# Patient Record
Sex: MALE
[De-identification: names, ages, dates, MRNs, and addresses within clinical notes are randomized per-mention and may not be internally consistent; named-entity substitution may affect disease eponyms.]

---

## 2020-07-27 ENCOUNTER — FORM ENCOUNTER (OUTPATIENT)
Age: 45
End: 2020-07-27

## 2020-07-28 PROBLEM — Z00.00 ENCOUNTER FOR PREVENTIVE HEALTH EXAMINATION: Status: ACTIVE | Noted: 2020-07-28

## 2020-07-29 ENCOUNTER — APPOINTMENT (OUTPATIENT)
Dept: ORTHOPEDIC SURGERY | Facility: CLINIC | Age: 45
End: 2020-07-29
Payer: COMMERCIAL

## 2020-07-29 VITALS
HEART RATE: 103 BPM | DIASTOLIC BLOOD PRESSURE: 97 MMHG | BODY MASS INDEX: 27.85 KG/M2 | HEIGHT: 69 IN | WEIGHT: 188 LBS | OXYGEN SATURATION: 98 % | SYSTOLIC BLOOD PRESSURE: 148 MMHG

## 2020-07-29 DIAGNOSIS — Z60.2 PROBLEMS RELATED TO LIVING ALONE: ICD-10-CM

## 2020-07-29 DIAGNOSIS — Z78.9 OTHER SPECIFIED HEALTH STATUS: ICD-10-CM

## 2020-07-29 DIAGNOSIS — G56.01 CARPAL TUNNEL SYNDROME, RIGHT UPPER LIMB: ICD-10-CM

## 2020-07-29 DIAGNOSIS — Z86.69 PERSONAL HISTORY OF OTHER DISEASES OF THE NERVOUS SYSTEM AND SENSE ORGANS: ICD-10-CM

## 2020-07-29 DIAGNOSIS — Z72.3 LACK OF PHYSICAL EXERCISE: ICD-10-CM

## 2020-07-29 DIAGNOSIS — Z56.0 UNEMPLOYMENT, UNSPECIFIED: ICD-10-CM

## 2020-07-29 DIAGNOSIS — Q76.49 OTHER CONGENITAL MALFORMATIONS OF SPINE, NOT ASSOCIATED WITH SCOLIOSIS: ICD-10-CM

## 2020-07-29 PROCEDURE — 73030 X-RAY EXAM OF SHOULDER: CPT | Mod: LT

## 2020-07-29 PROCEDURE — 73090 X-RAY EXAM OF FOREARM: CPT | Mod: LT

## 2020-07-29 PROCEDURE — 99204 OFFICE O/P NEW MOD 45 MIN: CPT

## 2020-07-29 SDOH — SOCIAL STABILITY - SOCIAL INSECURITY: PROBLEMS RELATED TO LIVING ALONE: Z60.2

## 2020-07-29 SDOH — ECONOMIC STABILITY - INCOME SECURITY: UNEMPLOYMENT, UNSPECIFIED: Z56.0

## 2020-07-29 NOTE — HISTORY OF PRESENT ILLNESS
[de-identified] : Mr. Edmonds he is a 45 year-old Left-hand-dominant gentleman who presents with shooting pains running up the LEFT arm from the wrist to the elbow as well as intermittent stabbing pains in his LEFT shoulder. The LEFT hand/forearm pain began about 3 years ago. He used to do a lot of boxing but he stopped. He had been treated by another doctor who recommended rest and pain did go away after a while but came back about 3 months ago without any particular reason. It hurts lifting and grabbing things Pain can be about 7/10 and shooting and stabbing. It's relatively constant. Forearm pain can occur with a hand hanging down.\par He has tried Advil, Tylenol and codeine without relief. He has not done physical therapy.\par He went for EMG studies about 2 weeks ago and this apparently showed carpal tunnel syndrome. I did get the report afterwards which shows moderate bilateral median mononeuropathy at the wrist consistent with carpal tunnel. There was no evidence of radiculopathy, myopathy, focal neuropathy or polyneuropathy. He was advised to wear wrist splint at nightbut found that the pain got much worse when he wore the splint which is certainly unusual.He states that the splints were not tight around his wrist.\par The shoulder pain started about 7-8 months ago and occurs when he raises his arm overhead. There was never any injury. Lying on the shoulder is painful. Pain is intermittent.\par He has had an MRI of his cervical spine.\par At the time of the visit he felt Mild numbness in the fingers of the thumb, index and middle fingers bilaterally slightly more on the breakdown the LEFT.\par He's also had issues in his legs with intermittent numbness down the legs

## 2020-07-29 NOTE — PHYSICAL EXAM
[UE] : Sensory: Intact in bilateral upper extremities [Rad] : radial 2+ and symmetric bilaterally [Normal RUE] : Right Upper Extremity: No scars, rashes, lesions, ulcers, skin intact [Normal LUE] : Left Upper Extremity: No scars, rashes, lesions, ulcers, skin intact [Normal Touch] : sensation intact for touch [Normal] : Oriented to person, place, and time, insight and judgement were intact and the affect was normal [de-identified] : No respiratory distress or coughing [de-identified] : Cervical spine and upper extremities\par Range of motion of the cervical spine causes mild pain on the LEFT side with full extension and bending to the RIGHT. No tenderness.\par  shoulder:\par No edema, ecchymoses, erythema bilateral upper extremities and skin is intact.\par Shoulder AROM: 180 FE, IR to T 9  , 180 abd.Pain LEFT shoulder at maximum elevation. No drop arm Or Shoulder shrug\par Shoulder PROM: 180 FE, 70 ER at the side, 90 ER and 50 RIGHT and 30° LEFT IR in the 90 degree abducted position.\par Motor:  5/5  supraspinatus,  5/5 ER, 5/5 IR, 5/5 biceps, 5/5 deltoid.  Normal lift off test\par LEFT + Neer and Alvarez. -  X-arm.   Mild pain on the LEFT with [place]'s, - Speeds.All negative on the RIGHT\par Nontender LEFT shoulder including a.c. joint  \par Laxity: normal. \par Elbow ROM bilaterally is 0-150° flexion and 90° pronation and supination.Tender at the LEFT lateral epicondylectomy and common extensor origin.\par Pain is reproduced on the LEFT side with resisted middle finger extension.\par No scapular winging.\par Sensation is intact distally in the UE Except some subjectively decreased sensation in the fingertips radially.\par Skin is intact in the UE. \par Intact Motor distally.Good  strength, finger abduction, biceps, triceps\par  [de-identified] : \par x-rays taken today of the LEFT shoulder 3 views show mild narrowing a.c. joint that may be consistent with mild a.c. arthrosis. Glenohumeral joint is normal. 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

## 2020-07-29 NOTE — REASON FOR VISIT
[Initial Visit] : an initial visit for [Shoulder Pain] : shoulder pain [FreeTextEntry2] : forearm pain

## 2020-07-29 NOTE — ASSESSMENT
[FreeTextEntry1] : 45-year-old left-hand-dominant gentleman with pain, Numbness upper and lower extremities. Today I was focusing on his LEFT upper extremity.. He has carpal tunnel syndrome confirmed by EMG studies. Based on exam he has lateral epicondylitis and LEFT shoulder rotator cuff tendinopathy/impingement. There is good strength of the rotator cuff but he has some loss of internal rotation and stiffness. For both these conditions I recommended physical therapy. He should do stretching and strengthening of the rotator cuff and periscapular muscles and stretch the forearm tendons. I suggested taking an anti-inflammatory starting with diclofenac orally for a few weeks and then he could use the Voltaren gel on his elbow or shoulder. 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 If his shoulder is not getting better I may refer him for an MRI or refer him for her imaging of the elbow if that is not improving. These conditions often can take several months at least to get better. Tennis elbow often take 6 months to a year.

## 2020-07-29 NOTE — CONSULT LETTER
[Dear  ___] : Dear  [unfilled], [Consult Letter:] : I had the pleasure of evaluating your patient, [unfilled]. [Consult Closing:] : Thank you very much for allowing me to participate in the care of this patient.  If you have any questions, please do not hesitate to contact me. [Please see my note below.] : Please see my note below. [Sincerely,] : Sincerely, [FreeTextEntry1] : In addition to the bilateral carpal tunnel syndrome he appears to have LEFT lateral epicondylitis/tennis elbow and LEFT shoulder rotator cuff tendinopathy/impingement syndrome. I referred him to physical therapy and recommended that he take an anti-inflammatory and I will see him back in 6-8 weeks to see if he is improving. His pain down the arm seems more severe than is typical so if it's not getting better further imaging may be necessary. [FreeTextEntry2] : Real Hills MD\par [address]\[place] [address] [FreeTextEntry3] : Poornima Nieto MD\par Orthopedic Surgery\par Sports Medicine\par

## 2020-08-24 ENCOUNTER — APPOINTMENT (OUTPATIENT)
Dept: ORTHOPEDIC SURGERY | Facility: CLINIC | Age: 45
End: 2020-08-24
Payer: COMMERCIAL

## 2020-08-31 PROBLEM — M25.512 CHRONIC LEFT SHOULDER PAIN: Status: ACTIVE | Noted: 2020-07-29

## 2020-09-01 ENCOUNTER — APPOINTMENT (OUTPATIENT)
Dept: ORTHOPEDIC SURGERY | Facility: CLINIC | Age: 45
End: 2020-09-01
Payer: COMMERCIAL

## 2020-09-01 DIAGNOSIS — G89.29 PAIN IN LEFT SHOULDER: ICD-10-CM

## 2020-09-01 DIAGNOSIS — M25.512 PAIN IN LEFT SHOULDER: ICD-10-CM

## 2020-09-01 PROCEDURE — 99214 OFFICE O/P EST MOD 30 MIN: CPT

## 2020-09-01 NOTE — HISTORY OF PRESENT ILLNESS
[de-identified] : Mr. Edmonds  presents for f/u for left UE pain.\par The Diclofenac helped "dramatically". He no longer has the constant severe pain although he still getting pain.\par He is going to PT and they are stretching and strengthening. He is only gone to therapy for about one week now.\par  Pain can be about 0-8/10 and shooting and stabbing. Certain movements are painful. Usually reaching or moving the arm in certain directions it is most painful..\par \par he is seeing a hand surgeon next week regarding the possible carpal tunnel. He did get wrist splints but they were causing more pain when sleeping. He showed me the splint and it looks like it's too long coming out to the fingers. I showed him pictures of wrist splints that I think her preferable. It should never be tighter compressing it regularly when sleeping at night

## 2020-09-01 NOTE — ASSESSMENT
[FreeTextEntry1] : 45-year-old left-hand-dominant gentleman with pain LEFT shoulder and upper extremity and has had some neurologic symptoms as well that started several years ago without any injury or inciting event.\par He just started the physical therapy a week ago. The diclofenac was very helpful and I renewed that and he can just take one tablet / day as needed.\par He will see the hand specialist regarding the possible carpal tunnel syndrome. If his pain is not getting better in another 3-4 weeks of physical therapy then he will get an MRI LEFT shoulder before I see him back in about one month.\par Followup in 6-8 weeks.

## 2020-09-01 NOTE — PHYSICAL EXAM
[de-identified] : Cervical spine and upper extremities\par Range of motion of the cervical spine causes mild pain on the LEFT side with full extension and bending to the RIGHT. No tenderness.\par  shoulder:\par No edema, ecchymoses, erythema bilateral upper extremities and skin is intact.\par Shoulder AROM: 180 FE, IR to T 9  , 180 abd. Pain LEFT shoulder at maximum elevation. No drop arm or Shoulder shrug\par Shoulder PROM: 180 FE, 70 ER at the side, 90 ER and 50 RIGHT and 30° LEFT IR in the 90 degree abducted position.\par Motor:  5/5  supraspinatus,  5/5 ER, 5/5 IR, 5/5 biceps, 5/5 deltoid.  Normal lift off test\par LEFT + Nida and Kim. -  X-arm.   Mild pain on the LEFT with [place]'s, \par Nontender LEFT shoulder including a.c. joint  \par Laxity: normal. \par Elbow ROM bilaterally is 0-150° flexion and 90° pronation and supination. \par Tender at the LEFT lateral epicondyle and common extensor origin.\par Pain is reproduced on the LEFT side with resisted middle finger extension.\par No scapular winging.\par Sensation is intact distally in the UE Except some subjectively decreased sensation in the fingertips rIGHT greater than LEFT.\par Skin is intact in the UE. \par Intact Motor distally. Good  strength, finger abduction, biceps, triceps\par  [de-identified] : No respiratory distress or coughing

## 2020-09-10 ENCOUNTER — APPOINTMENT (OUTPATIENT)
Dept: ORTHOPEDIC SURGERY | Facility: CLINIC | Age: 45
End: 2020-09-10

## 2020-10-06 ENCOUNTER — APPOINTMENT (OUTPATIENT)
Dept: ORTHOPEDIC SURGERY | Facility: CLINIC | Age: 45
End: 2020-10-06

## 2020-10-28 ENCOUNTER — APPOINTMENT (OUTPATIENT)
Dept: ORTHOPEDIC SURGERY | Facility: CLINIC | Age: 45
End: 2020-10-28

## 2020-10-30 ENCOUNTER — APPOINTMENT (OUTPATIENT)
Dept: ORTHOPEDIC SURGERY | Facility: CLINIC | Age: 45
End: 2020-10-30
Payer: COMMERCIAL

## 2020-10-30 DIAGNOSIS — G57.11 MERALGIA PARESTHETICA, RIGHT LOWER LIMB: ICD-10-CM

## 2020-10-30 DIAGNOSIS — M77.12 LATERAL EPICONDYLITIS, LEFT ELBOW: ICD-10-CM

## 2020-10-30 DIAGNOSIS — G56.02 CARPAL TUNNEL SYNDROME, LEFT UPPER LIMB: ICD-10-CM

## 2020-10-30 DIAGNOSIS — S43.432A SUPERIOR GLENOID LABRUM LESION OF LEFT SHOULDER, INITIAL ENCOUNTER: ICD-10-CM

## 2020-10-30 DIAGNOSIS — M67.912 UNSPECIFIED DISORDER OF SYNOVIUM AND TENDON, LEFT SHOULDER: ICD-10-CM

## 2020-10-30 PROCEDURE — 99213 OFFICE O/P EST LOW 20 MIN: CPT | Mod: 95

## 2020-10-30 RX ORDER — DICLOFENAC SODIUM 50 MG/1
50 TABLET, DELAYED RELEASE ORAL TWICE DAILY
Qty: 30 | Refills: 0 | Status: ACTIVE | COMMUNITY
Start: 2020-10-30 | End: 1900-01-01

## 2020-10-30 RX ORDER — DICLOFENAC SODIUM 50 MG/1
50 TABLET, DELAYED RELEASE ORAL TWICE DAILY
Qty: 30 | Refills: 0 | Status: COMPLETED | COMMUNITY
Start: 2020-07-29 | End: 2020-10-30

## 2020-10-30 NOTE — ASSESSMENT
[FreeTextEntry1] : 45-year-old left-hand-dominant gentleman with pain LEFT shoulder and upper extremity and has had some neurologic symptoms as well that started several years ago without any injury or inciting event.\par His exam when I last saw him was consistent with lateral epicondylitis in the LEFT elbow and his shoulder exam was consistent with rotator cuff tendinopathy and certainly could be consistent with a SLAP tear.\par I discussed the findings on the MRIs. His elbow MRI findings are very consistent with lateral epicondylitis.This is a very common condition in middle age and often lingers and takes a long time to go away but usually will resolve without surgery. I would recommend giving it more time. I would not recommend doing any more steroid injections if possible. PRP injection is reasonable and may help but not always.\par Right now with the increased pain since he did the injection I would recommend taking a course of an anti-inflammatory again for 1-2 weeks. He can take diclofenac again. He is using a lidocaine patch also which helped somewhat.\par He should avoid painful activities and do some stretching and heat and ice.\par His shoulder there is a SLAP tear which again may or may not need surgery. Often degenerative SLAP tears do not cause significant pain or require surgery. If he has ongoing pain however and isn't exam seems consistent with it than one may consider surgical treatment. Right now his shoulder pain is on the very mild side and he should just continue with the exercises.\par He also gets some neurologic symptoms which may be consistent with a carpal tunnel syndrome. If it's position all and he can resolve if he changes position I would recommend doing so.\par In terms of the numbness over the RIGHT thigh that may be meralgia paresthetica versus a lumbar radiculopathy. If it only occurs with certain activities he should avoid them He shouldn't have anything tight around his hip bone/superior iliac crest to avoid pressure on the lateral femoral cutaneous nerve.\par He will followup in a few months or as needed. Preferably he'll follow up in person if he's back in [place]. If he is having a lot of pain or issues he may need to see someone wherever he is staying.

## 2020-10-30 NOTE — HISTORY OF PRESENT ILLNESS
[Other Location: e.g. School (Enter Location, City,State)___] : at [unfilled], at the time of the visit. [Medical Office: ([facility])___] : at the medical office located in  [Verbal consent obtained from patient] : the patient, [unfilled] [de-identified] : Mr. Edmonds  presents for f/u for left UE pain.\par His pain in the shoulder has gotten somewhat better with therapy and taking the anti-inflammatory several months ago. He still has mild pain in the shoulder that can be about 2/10. He had ongoing pain and her friend referred him to his orthopedic surgeon Dr. Saravia. He ordered an MRI of the shoulder as well as his elbow. The MRI of the shoulder was performed on September 30, 2020 and showed rotator cuff tendinopathy, SLAP tear and suggested that the impingement syndrome.\par He had a steroid injection after that which was done about 3 weeks ago and they have helped the shoulder a little bit.\par He was having ongoing pain in his elbow as well and went for an MRI on October 6, 2020 which was consistent with lateral epicondylitis with tendinopathy and small tears. He had a steroid injection performed 3 weeks ago and since then the elbow pain has been more constant and severe and seems to radiate more proximally as well as distally.\par He is in [place] now and will be there for about another month and then maybe going to [place]. He states that the elbow pains about a 5-6/10. Dr. Saravia also talked to him about PRP injections which are not likely covered by his insurance. He was wondering my opinion on that. Recently he hasn't been taking any medication. He does the exercises on his own.\par he still gets occasional tingling and numbness in his fingers but it's usually position are activity-related such as when he is driving or reading a book.\par He also started to get some numbness in his RIGHT thigh anteriorly when he runs. He did not complain of any back pain.

## 2020-10-30 NOTE — PHYSICAL EXAM
[de-identified] : Cervical spine and upper extremities\par Range of motion of the cervical spine Intact\par No edema, ecchymoses, erythema bilateral upper extremities and skin is intact.\par Shoulder AROM: 180 FE, IR to T9 or 10 on the LEFT and better on the RIGHT to about T7\par motor could not be tested.\par Elbow ROM bilaterally is 0-150° flexion and 90° pronation and supination. \par Tender at the LEFT lateral Elbow.\par He has intact feeling in his fingers and distally. He can actively move his fingers well.\par Skin is intact in the UE. \par Normal color [de-identified] : No respiratory distress or coughing [de-identified] : \par